# Patient Record
Sex: MALE | Race: WHITE | NOT HISPANIC OR LATINO | Employment: FULL TIME | URBAN - METROPOLITAN AREA
[De-identification: names, ages, dates, MRNs, and addresses within clinical notes are randomized per-mention and may not be internally consistent; named-entity substitution may affect disease eponyms.]

---

## 2024-06-19 ENCOUNTER — OFFICE VISIT (OUTPATIENT)
Dept: URGENT CARE | Facility: CLINIC | Age: 24
End: 2024-06-19
Payer: COMMERCIAL

## 2024-06-19 VITALS
OXYGEN SATURATION: 95 % | BODY MASS INDEX: 23.52 KG/M2 | WEIGHT: 157 LBS | HEART RATE: 58 BPM | SYSTOLIC BLOOD PRESSURE: 116 MMHG | TEMPERATURE: 98.1 F | DIASTOLIC BLOOD PRESSURE: 73 MMHG

## 2024-06-19 DIAGNOSIS — S61.211A LACERATION OF LEFT INDEX FINGER WITHOUT FOREIGN BODY WITHOUT DAMAGE TO NAIL, INITIAL ENCOUNTER: Primary | ICD-10-CM

## 2024-06-19 PROBLEM — F32.A ANXIETY AND DEPRESSION: Status: ACTIVE | Noted: 2017-09-11

## 2024-06-19 PROBLEM — F32.2 MAJOR DEPRESSIVE DISORDER, SINGLE EPISODE, SEVERE WITHOUT PSYCHOTIC FEATURES (MULTI): Status: RESOLVED | Noted: 2024-06-19 | Resolved: 2024-06-19

## 2024-06-19 PROBLEM — F17.200 SMOKER: Status: ACTIVE | Noted: 2024-06-19

## 2024-06-19 PROBLEM — L70.9 ACNE: Status: ACTIVE | Noted: 2024-06-19

## 2024-06-19 PROBLEM — F41.9 ANXIETY AND DEPRESSION: Status: ACTIVE | Noted: 2017-09-11

## 2024-06-19 PROCEDURE — 99214 OFFICE O/P EST MOD 30 MIN: CPT

## 2024-06-19 PROCEDURE — 12001 RPR S/N/AX/GEN/TRNK 2.5CM/<: CPT

## 2024-06-19 RX ORDER — ESCITALOPRAM OXALATE 10 MG/1
1 TABLET ORAL DAILY
COMMUNITY
Start: 2021-09-04

## 2024-06-19 RX ORDER — HYDROXYZINE PAMOATE 25 MG/1
CAPSULE ORAL EVERY 8 HOURS
COMMUNITY

## 2024-06-19 RX ORDER — SERTRALINE HYDROCHLORIDE 50 MG/1
50 TABLET, FILM COATED ORAL
COMMUNITY

## 2024-06-19 RX ORDER — FLUOXETINE HYDROCHLORIDE 20 MG/1
1 CAPSULE ORAL EVERY 24 HOURS
COMMUNITY

## 2024-06-19 RX ORDER — TRAZODONE HYDROCHLORIDE 50 MG/1
50 TABLET ORAL NIGHTLY PRN
COMMUNITY
Start: 2023-07-03

## 2024-06-19 RX ORDER — ARIPIPRAZOLE 2 MG/1
TABLET ORAL EVERY 24 HOURS
COMMUNITY

## 2024-06-19 RX ORDER — CEPHALEXIN 500 MG/1
500 CAPSULE ORAL 2 TIMES DAILY
Qty: 14 CAPSULE | Refills: 0 | Status: SHIPPED | OUTPATIENT
Start: 2024-06-19 | End: 2024-06-26

## 2024-06-19 ASSESSMENT — ENCOUNTER SYMPTOMS
HEADACHES: 0
FATIGUE: 0
VOMITING: 0
DIAPHORESIS: 0
FEVER: 0
CHILLS: 0
NAUSEA: 0
ARTHRALGIAS: 0
COLOR CHANGE: 0
SHORTNESS OF BREATH: 0
PALPITATIONS: 0
JOINT SWELLING: 0
DIARRHEA: 0
BRUISES/BLEEDS EASILY: 0
MUSCULOSKELETAL NEGATIVE: 1
GASTROINTESTINAL NEGATIVE: 1
EXTREMITY NUMBNESS: 1
WEAKNESS: 0
RESPIRATORY NEGATIVE: 1
ABDOMINAL PAIN: 0
WOUND: 1
FOCAL WEAKNESS: 0
MYALGIAS: 0
COUGH: 0
DIZZINESS: 0
CONSTITUTIONAL NEGATIVE: 1
NUMBNESS: 1
CARDIOVASCULAR NEGATIVE: 1

## 2024-06-19 ASSESSMENT — VISUAL ACUITY: OU: 1

## 2024-06-19 NOTE — LETTER
June 19, 2024     Patient: Kaleb Rose   YOB: 2000   Date of Visit: 6/19/2024       To Whom It May Concern:    It is my medical opinion that Kaleb Rose may return to light duty immediately with the following restrictions: limit use of left hand - with no use of left index finger, no lifting over 15lbs, no soaking in water, and avoid touching very dirty surfaces that could contaminate wound for the next 7 days . Patient may return to full work duty after wound is healed completely - expected by 7/1/24.    If you have any questions or concerns, please don't hesitate to call.         Sincerely,        Yudith Burgess PA-C    CC: No Recipients

## 2024-06-19 NOTE — PROGRESS NOTES
Subjective   History  Kaleb Milton is a 24 y.o. male who presents for Laceration.    Patient presents with laceration to of left index finger pad with a  at home about 1 hour ago. He reports lightheadedness and 1 episode vomiting just after, but feels okay now. Patient reports feeling some numbness distal to the injury and pain of the finger, but no swelling or trouble moving/bending the finger. He reports that he does not believe he is up to date on tetanus. Records show patients last Tdap was 12/20/2022.       History provided by:  Patient and medical records  Laceration  Location:  Finger  Finger laceration location:  L index finger  Length:  1.8cm  Depth:  Through dermis  Quality comment:  Straight V-shaped flap  Bleeding: venous and controlled    Pain details:     Quality:  Aching and throbbing  Foreign body present:  No foreign bodies  Tetanus status:  Unknown  Associated symptoms: numbness and redness    Associated symptoms: no fever, no focal weakness, no rash, no swelling and no streaking        Past Surgical History:   Procedure Laterality Date    OTHER SURGICAL HISTORY  05/11/2020    Neck surgery     Social History     Tobacco Use    Smoking status: Not on file    Smokeless tobacco: Not on file   Substance Use Topics    Alcohol use: Not on file    Drug use: Not on file       Review of Systems   Constitutional: Negative.  Negative for chills, diaphoresis, fatigue and fever.   Respiratory: Negative.  Negative for cough and shortness of breath.    Cardiovascular: Negative.  Negative for chest pain and palpitations.   Gastrointestinal: Negative.  Negative for abdominal pain, diarrhea, nausea and vomiting.   Musculoskeletal: Negative.  Negative for arthralgias, joint swelling and myalgias.   Skin:  Positive for wound. Negative for color change, pallor and rash.   Neurological:  Positive for numbness. Negative for dizziness, focal weakness, weakness and headaches.   Hematological:  Does not  bruise/bleed easily.       Objective   Vital Signs  /73   Pulse 58   Temp 36.7 °C (98.1 °F)   Wt 71.2 kg (157 lb)   SpO2 95%   BMI 23.52 kg/m²    All vitals have been reviewed and are stable.    Diagnostic Results    No results found for this or any previous visit (from the past 24 hour(s)).     Physical Exam  Physical Exam  Vitals and nursing note reviewed.   Constitutional:       General: He is awake. He is not in acute distress.     Appearance: Normal appearance. He is well-developed. He is not ill-appearing.   HENT:      Head: Normocephalic and atraumatic.      Nose: Nose normal.      Mouth/Throat:      Lips: Pink.      Mouth: Mucous membranes are moist.      Pharynx: Oropharynx is clear.   Eyes:      General: Lids are normal. Vision grossly intact. Gaze aligned appropriately.      Extraocular Movements: Extraocular movements intact.      Conjunctiva/sclera: Conjunctivae normal.      Right eye: Right conjunctiva is not injected.      Left eye: Left conjunctiva is not injected.      Pupils: Pupils are equal, round, and reactive to light.   Cardiovascular:      Rate and Rhythm: Normal rate and regular rhythm.   Pulmonary:      Effort: Pulmonary effort is normal. No respiratory distress.   Abdominal:      General: Abdomen is flat. There is no distension.   Musculoskeletal:         General: No swelling or deformity. Normal range of motion.      Right hand: Normal.      Left hand: Laceration and tenderness (distal index finger) present. No swelling, deformity or bony tenderness. Normal range of motion. Normal strength.      Cervical back: Full passive range of motion without pain, normal range of motion and neck supple.   Skin:     General: Skin is warm and dry.      Coloration: Skin is not pale.      Findings: Laceration (1.8cm V-shaped flap of left 2nd fingerpad) present. No bruising, ecchymosis, petechiae or rash.   Neurological:      General: No focal deficit present.      Mental Status: He is alert  and oriented to person, place, and time. Mental status is at baseline.      Motor: Motor function is intact.      Coordination: Coordination is intact.   Psychiatric:         Mood and Affect: Mood and affect normal.         Speech: Speech normal.         Behavior: Behavior normal. Behavior is cooperative.         Thought Content: Thought content normal.         Judgment: Judgment normal.         Assessment/Plan   Procedures  Patient ID: Kaleb Rose is a 24 y.o. male.    Laceration Repair    Date/Time: 6/19/2024 9:10 PM    Performed by: Yudith Burgess PA-C  Authorized by: Yudith Burgess PA-C    Consent:     Consent obtained:  Verbal    Consent given by:  Patient and parent    Risks, benefits, and alternatives were discussed: yes      Risks discussed:  Infection, pain, retained foreign body, vascular damage, poor wound healing, poor cosmetic result, nerve damage, need for additional repair and tendon damage    Alternatives discussed: stitches vs. glue/strips - patient would like to try glue.  [place] protocol:     Procedure explained and questions answered to patient or proxy's satisfaction: yes      Relevant documents present and verified: yes      Required blood products, implants, devices, and special equipment available: yes      Patient identity confirmed:  Verbally with patient  Anesthesia:     Anesthesia method:  None  Laceration details:     Location:  Finger    Finger location:  L index finger    Length (cm):  1.8    Depth (mm):  2  Exploration:     Hemostasis achieved with:  Direct pressure    Imaging outcome: foreign body not noted      Wound exploration: wound explored through full range of motion and entire depth of wound visualized      Wound extent: areolar tissue violated, fascia violated and vascular damage      Wound extent: no foreign bodies/material noted and no underlying fracture noted      Contaminated: yes    Treatment:     Area cleansed with:  Chlorhexidine and soap and water    Amount of  cleaning:  Standard  Skin repair:     Repair method:  Steri-Strips and tissue adhesive    Number of Steri-Strips:  2  Approximation:     Approximation:  Close  Repair type:     Repair type:  Simple  Post-procedure details:     Dressing:  Splint for protection    Procedure completion:  Tolerated well, no immediate complications      Problem List Items Addressed This Visit    None  Visit Diagnoses       Laceration of left index finger without foreign body without damage to nail, initial encounter    -  Primary    Relevant Medications    cephalexin (Keflex) 500 mg capsule            [facility] Course  MDM    Patient disposition: Home    Red flags for reporting to ER have been reviewed with the patient.    Current diagnosis, any medication changes, and all in-office lab or radiologic results have been reviewed with the patient at the time of the visit.   If symptoms do not improve or worsen, patient is to follow up with PCP or report to the emergency room.   Patient is alert and oriented x3 and non-toxic appearing. Vital signs are stable.   Patient and/or guardian has sufficient decision-making capabilities at this time and reports understanding and agreement with the treatment plan made through shared decision-making.

## 2024-06-20 NOTE — PATIENT INSTRUCTIONS
LACERATION     - Laceration repair was done in office with skin adhesive and steri-strips  - Leave original bandage on for first 24 hours    - Keep wound clean and dry, keep covered to prevent dirt from contaminating wound if active  - Do not soak wound in water (no swimming/hot tubs, baths, dishes)  - Avoid skin friction or tension as much as possible, if brace was provided wear for at least 5 days and nights and continue wearing at night until completely healed   - Glue and strips will wear off over time in 5-10 days - if edges become loose, you may trim excess, but do not pull the rest off  - Check wound daily and if wound edges separate or steri-strips lift, may replace strips    If fever, rash, increased pain, swelling. redness, or red streaks in the skin near the wound site occur, seek emergency care